# Patient Record
Sex: FEMALE | Race: OTHER | Employment: FULL TIME | URBAN - METROPOLITAN AREA
[De-identification: names, ages, dates, MRNs, and addresses within clinical notes are randomized per-mention and may not be internally consistent; named-entity substitution may affect disease eponyms.]

---

## 2023-02-15 ENCOUNTER — OFFICE VISIT (OUTPATIENT)
Age: 43
End: 2023-02-15
Payer: COMMERCIAL

## 2023-02-15 VITALS
RESPIRATION RATE: 16 BRPM | TEMPERATURE: 98.1 F | HEIGHT: 63 IN | OXYGEN SATURATION: 99 % | WEIGHT: 200 LBS | BODY MASS INDEX: 35.44 KG/M2 | DIASTOLIC BLOOD PRESSURE: 88 MMHG | SYSTOLIC BLOOD PRESSURE: 132 MMHG | HEART RATE: 86 BPM

## 2023-02-15 DIAGNOSIS — E66.01 MORBID OBESITY (HCC): Primary | ICD-10-CM

## 2023-02-15 DIAGNOSIS — I50.9 CONGESTIVE HEART FAILURE, UNSPECIFIED HF CHRONICITY, UNSPECIFIED HEART FAILURE TYPE (HCC): ICD-10-CM

## 2023-02-15 PROCEDURE — 99205 OFFICE O/P NEW HI 60 MIN: CPT | Performed by: STUDENT IN AN ORGANIZED HEALTH CARE EDUCATION/TRAINING PROGRAM

## 2023-02-15 RX ORDER — BENZONATATE 100 MG/1
100 CAPSULE ORAL 3 TIMES DAILY PRN
COMMUNITY

## 2023-02-15 RX ORDER — ALBUTEROL SULFATE 0.63 MG/3ML
1 SOLUTION RESPIRATORY (INHALATION) EVERY 6 HOURS PRN
COMMUNITY

## 2023-02-15 NOTE — PROGRESS NOTES
Austin Esqueda is a 44 y.o. female  Chief Complaint   Patient presents with    New Patient     Consult for Gastric Sleeve     Vitals:    02/15/23 1127   BP: 132/88   Pulse:    Resp:    Temp:    SpO2:          Ambulatory Bariatric Summary 2/15/2023 6/34/2889   Systolic 752 397   Diastolic 88 90   Pulse - 86   Temp - 98.1   Resp - 16   Weight - 200   Height - 63   BMI - 35.5 kg/m2       Body mass index is 35.43 kg/m².

## 2023-02-15 NOTE — PROGRESS NOTES
Bariatric Surgery Initial Consult    Patient: Rheta Bernheim MRN: [id number]  SSN: [id number]    YOB: 1980  Age: 44 y.o. Sex: female      Subjective:      CC: Morbid Obesity    Current Weight: 200  Body mass index is 35.43 kg/m². Ideal body weight: 52.4 kg (115 lb 8.3 oz)  Adjusted ideal body weight: 67.7 kg (149 lb 5 oz)  Excess Body Weight: 59    Rheta Bernheim is a 44 y.o. female who is being seen for new bariatric consultation. She has been considering surgery for approximately the last year. She has failed multiple prior attempts at meaningful and sustained weight loss with previous attempts including a low calorie high exercise diet that yielded as much as 20 pounds of weight loss in 2018 with subsequent recidivism. She did trial phentermine on and off for several years without any significant weight loss. She did have a new diagnosis of CHF/unspecified cardiomyopathy in November 2022 when she was admitted to [facility] for shortness of breath, cough, and peripheral edema. Her cardiologist is Dr. Bojorquez, those notes are not available currently. She reports being better controlled on her current regimen and that she was told previously that her cardiomyopathy may be secondary to prior phentermine use. Other obesity related comorbidities include hypertension which has been controlled on metoprolol. She is interested in a sleeve gastrectomy. She denies a history of reflux/heartburn, regurgitation.     PMH: HTN, unspecified cardiomyopathy    PSH: Denies    Allergies   Allergen Reactions    Naproxen Hives    Spironolactone Hives    Tramadol Itching     Current Outpatient Medications   Medication Sig Dispense Refill    benzonatate (TESSALON) 100 MG capsule Take 100 mg by mouth 3 times daily as needed for Cough      albuterol (ACCUNEB) 0.63 MG/3ML nebulizer solution Take 1 ampule by nebulization every 6 hours as needed for Wheezing      Sacubitril-Valsartan (ENTRESTO PO) Take by mouth       No current facility-administered medications for this visit.     Social History     Tobacco Use    Smoking status: Every Day     Types: Cigarettes    Smokeless tobacco: Never   Substance Use Topics    Alcohol use: Not on file   -Currently smokes 3 to 4 cigarettes daily, has been cutting down    Family history reviewed and is unremarkable     Last Pap Smear: Needs updated  Mammogram: None  Colonoscopy: N/A  EGD/UGI: None  STOP-BANG score: 1  GERD-HRQL score: 0    Review of Systems:    General: Denies fevers, chills, night sweats, fatigue, weight loss, or weight gain.    HEENT: Denies changes in auditory or visual acuity, recurrent pharyngitis, epistaxis, chronic rhinorrhea, vertigo    Respiratory: Denies increasing shortness of breath, productive cough, hemoptysis    Cardiac: + History of cardiomyopathy/CHF per HPI    GI: Denies dysphagia, recurrent emesis, hematemesis, changes in bowel habits, hematochezia, melena    : Denies hematuria frequency urgency dysuria    Musculoskeletal: Denies fractures, dislocations    Neurologic: Denies history of CVA, paralysis paresthesias, recurrent cephalgia, seizures    Endocrine: Denies polyuria, polydipsia, polyphagia, heat and cold intolerance    Lymph/heme: Denies a history of malignancy, anemia, bruising, blood transfusions    Integumentary: Negative for dermatitis     Psych: Denies a history of depression or anxiety    Objective:     Vitals:    02/15/23 1125 02/15/23 1127   BP: (!) 156/90 132/88   Site: Left Lower Arm    Position: Sitting    Pulse: 86    Resp: 16    Temp: 98.1 °F (36.7 °C)    TempSrc: Temporal    SpO2: 99%    Weight: 200 lb (90.7 kg)    Height: 5' 3\" (1.6 m)         General: no acute distress, nontoxic in appearance.  Head: Normocephalic, atraumatic  Mouth: Clear, no overt lesions, oral mucosa is pink and moist.  Neck: Supple, no masses, trachea midline  Resp: Clear to auscultation bilaterally, no wheezing. Excursions normal and  symmetrical.  Cardio: Regular rate and rhythm, no murmurs  Abdomen: soft, nontender, nondistended, no hernias. Extremities: Warm, well perfused, no tenderness or swelling, normal gait/station, without edema or varicosities  Neuro: Sensation and strength grossly intact and symmetrical.  Psych: Alert and oriented to person, place, and time. Labs:       Assessment: This patient is a 44 y.o. obese female with a current Body mass index is 35.43 kg/m². who is considering bariatric surgery, and would be an appropriate candidate for enrollment in the preoperative pathway. The patient is considering Laparoscopic Sleeve Gastrectomy for surgical weight loss due to their ineffective progress with medical forms of weight loss and the urging of their physicians who care for their primary medical issues. The patient  now presents  for consideration for weight loss surgery understanding the benefits of this over a medical approach of weight loss as was discussed in our seminar on weight loss surgery. They have discussed their plans both with their family and primary care physician who is in support of their pursuit of such. The patient's goal weight is 160 lb. These goals are consistent with expected outcomes of their desired operation. Her Medical goals are resolution of these health issues. The possible short and long term  complications of the sleeve gastrectomy were also discussed, to include but not limited to;death, DVT/PE, staple line leak, bleeding, stricture formation, surgical site infection, and reflux. I spent a significant amount of time emphasizing the reflux risk from this procedure which ranges from 25-40% postoperatively.   Specific weight related outcomes for success were also discussed with an emphasis on careful and close follow-up with the first year and dietary behavior modification over the first years as baseline cyclical hunger returns  The patient expressed an understanding of the above factors, and her questions were answered in their entirety. Plan: Will plan to enroll in the preoperative bariatric pathway. I discussed our program smoking policy. She states that she will quit smoking cold turkey. I would like her to get her cotinine lab draw without the aid of nicotine patches, though if she requires those ultimately to stay smoke-free I can provide a prescription for that. Will schedule consultation with our dietitian  Will schedule for preoperative psych clearance  Standard preoperative bariatric lab panel ordered. H. pylori breath test unable to be performed today and will be performed at her midpoint evaluation  Upper GI ordered  Discussed the need for a PCP. The patient recently moved to this area and has not established care yet. She additionally requires routine cancer screening such as Pap smear and mammogram.  Return to clinic for midpoint evaluation      I spent >60 minutes on this patient's care today, including reviewing all pertinent medical records, imaging, performing a history and physical exam, documenting, and coordinating future care.     Signed By: Gerda Fleming MD     February 15, 2023

## 2023-03-01 ENCOUNTER — CLINICAL DOCUMENTATION (OUTPATIENT)
Facility: HOSPITAL | Age: 43
End: 2023-03-01

## 2023-03-01 NOTE — PROGRESS NOTES
[address] [place] [facility] [place] [address] [facility], [address]    Patient's Name: Emili White   Age: 44 y.o. YOB: 1980   Sex: female     Insurance:  1           Session: 1 of 12  Revision:    Surgeon: Dr. Sharla Martin  Date: 3/1/2023    Height: 5 f 3   Weight:    200      Lbs. BMI:    Pounds Lost since last month: 0                 Pounds Gained since last month: 0      Starting Weight: 200     Previous Months Weight: 200  Overall Pounds Lost: 0   Overall Pounds Gained: 0      Do you smoke? Yes, 5-8 cigarettes per day    Alcohol intake:  Number of drinks at a time:  None  Number of times a week: None    Patient has not attended a support group. Information was provided. Class Guidelines    Guidelines are reviewed with patient at the start of every class. 1. Patient understands that weight loss trial classes must be consecutive. Patient understands if they miss a class, it is their responsibility to contact me to reschedule class. I will reach out to patient after their first no show. 2.  Patient understands the expectations that weight maintenance/weight loss is expected during the classes. Failure to demonstrate changes may result in one extra month of weight loss trial, followed by going back to see the surgeon. Patient understands that they CAN NOT gain any weight during the weight loss trial.  Gaining weight will result in extra classes. 3. Patient is also instructed to be doing their labs, blood work, psych visit, support group and any other test that the surgeon has used while they are working on their weight loss trial.  4.  Patient was instructed to bring their blue binder to every class and appointment. Other Pertinent Information:     Changes Made Since Last Class: less rice    Eating Habits and Behaviors    Today in class, we reviewed the key diet principles.   I have talked to patient about pushing the fluid and working towards 64 ounces per day. Patient was given ideas of liquids that would be okay. Patient was encouraged to cut out liquid calories, such as soda and sweet tea. We talked about the reasons that sugar sweetened beverages can promote weight gain. Sugar is highly palatable. Excessive consumption of sugar can trigger an exaggerated release of dopamine, which can promote a compulsive drive to consume more sugar sweetened beverages. Also, satiety is not reached with liquid calories the same way it does with solid calories. In class, we also talked about focusing on protein and low carbohydrates. Patient was encouraged during the weight loss trial to keep their carbohydrate less than 75 grams per day and their protein level at 60-80 grams per day. We talked about meal choices and snack ideas. Patient was given a packet on carbohydrate substitutions and recipe exchanges. This will allow them to still have some of the foods they enjoy, but a lower carbohydrate alternative. Patient's current diet habits include: Patient is eating 3 meals per day. Patient states their portions are regular plate. I have recommended patient to use a smaller plate and to drink water prior to their meal to help fill them up. We talked about eating and drinking post op and stopping 30 minutes before and after. Patient indicates they are eating out 3 times a month. This includes fast food, carry out, and sit down restaurants. I have talked to patient about starting to plan ahead to cut down on eating out. Patient indicates their indicate of bread, rice, pasta, crackers to be few times a week. I have talked to patient about the importance of focusing on protein at meals. We talked about trying to limit carbohydrates. Patient is drinking 7-8 bottles a day . Patient was encouraged to aim for 64 ounces daily. I talked about focusing on zero calorie liquids and not drinking calories and weaning from caffeine. Physical Activity/Exercise    Comments: We talked about exercise. Patient was given reasons of why exercise is so important and how that can help with their long-term success. I have encouraged patient to get a support system to help with the activity. Patient is currently doing 30 minutes of cardio in the AM for activity. Behavior Modification       Comments:  During today's lesson, I also spent some time talking about behavior changes. I talked to patient about the importance of taking vitamins post op and we reviewed the vitamins that patients will be taking post op. Patient will hear this again at pre op class before surgery. Patient had the opportunity to ask questions about these vitamins that will be lifelong. Goals that patient wants to work on includes:  1. Cut down on the magen  2.        Mario Comment, Juanjose Sutton 87 RD  3/1/2023

## 2023-03-17 ENCOUNTER — CLINICAL DOCUMENTATION (OUTPATIENT)
Facility: HOSPITAL | Age: 43
End: 2023-03-17

## 2023-03-17 NOTE — PROGRESS NOTES
[facility]  [address], [address]    Behavior Modification Intervention    Patient's Name: Stephen Castillo   Age: 43 y.o.  YOB: 1980   Sex: female    Date:   3/17/2023              Session: 2 of 12  Surgeon:  Dr. Martinez    Height: 5 f 3   Weight:    200      Lbs.     BMI:    Pounds Lost since last month: 0                 Pounds Gained since last month: 0    Starting Weight: 200     Previous Month's Weight: 200  Overall Pounds Lost: 0   Overall Pounds Gained: 0    Do you smoke?  Yes    Alcohol intake:  Number of drinks at a time:  None  Number of times a week: None    Class Guidelines    Guidelines are reviewed with patient at the start of every class.    1. Patient understands that weight loss trial classes must be consecutive.  Patient understands if they miss a class, it is their responsibility to contact me to reschedule class.  I will reach out to patient after their first no show.  2.  Patient understands the expectations that weight maintenance/weight loss is expected during the classes.  Failure to demonstrate changes may result in one extra month of weight loss trial, followed by going back to see the surgeon.    3. Patient is also instructed to be doing their labs, blood work, psych visit, support group and any other test that the surgeon has used while they are working on their weight loss trial.   Patient understands that they CAN NOT gain any weight during the weight loss trial.  Gaining weight will result in extra classes    Other Pertinent Information:         Behavior Modification Intervention    At today's class, we talked about the following behavior modification:   Staying Motivated and Managing Stress.     We first talked about identifying triggers.  Keeping a stress journal can help them identify how they react when they are faced with a stressful event.  Patient is encouraged to avoid trigger foods  when they are experiencing stress. Other tips for managing stress include:  - Find enjoyment in weight loss efforts. Have an \"I can do it\" attitude. - Lose weight for YOU increases the likelihood of long-term success. External factors may work for a short time, but when the fear decreases or the event is over, the motivation to keep losing can fade. Internal factors include having more energy, less medications, higher self-esteem. Stress techniques to control overeating include:  Exercising  Using deep breathing  Organize your day with To Do Lists  Set priorities  Delegate responsibilities  Make time for you. We also talked about ways to keep motivation high. These include:  - Keeping plateaus in perspective. - Get in touch with feelings. - Separate their weight from their self-worth. Patient was then asked to identify their most stressful event or situation in their life. Patient was then asked to list the ways they respond to stress: states she tries to sleep or meditate. Patient and I talked about strategies to help reduce or manage the stress in their life. Patient felt they could: mediate or drink more water or walk    Today Registered Dietitian met with the patient and assessed how patient is doing with the goals that were set. Patient states they are doing well. States following key diet principles and trying not to eat after 6 pm.    RD also re-emphasized the diet key principles. Patient was encouraged to start drinking 64 ounces of fluid per day. Patient was encouraged to start cutting out soda, caffeine, carbonation, sweet tea, fruit juice, and fruit smoothies. Patient was instructed to lower carbohydrates to 75 grams or less per day and focus on protein-based foods. Patient was also instructed to work towards 30 minutes of activity per day. Suggestions for activity were reviewed. Will follow up in 2 weeks.   Emilee Carranza RD

## 2023-03-21 ENCOUNTER — HOSPITAL ENCOUNTER (OUTPATIENT)
Facility: HOSPITAL | Age: 43
Discharge: HOME | End: 2023-03-24
Payer: COMMERCIAL

## 2023-03-21 DIAGNOSIS — E66.01 MORBID OBESITY (HCC): ICD-10-CM

## 2023-03-21 PROCEDURE — 74246 X-RAY XM UPR GI TRC 2CNTRST: CPT

## 2023-03-21 PROCEDURE — 6370000000 HC RX 637 (ALT 250 FOR IP): Performed by: STUDENT IN AN ORGANIZED HEALTH CARE EDUCATION/TRAINING PROGRAM

## 2023-03-21 PROCEDURE — 2500000003 HC RX 250 WO HCPCS: Performed by: STUDENT IN AN ORGANIZED HEALTH CARE EDUCATION/TRAINING PROGRAM

## 2023-03-21 RX ADMIN — BARIUM SULFATE 140 ML: 980 POWDER, FOR SUSPENSION ORAL at 11:11

## 2023-03-21 RX ADMIN — ANTACID/ANTIFLATULENT 1 EACH: 380; 550; 10; 10 GRANULE, EFFERVESCENT ORAL at 11:11

## 2023-03-21 RX ADMIN — BARIUM SULFATE 176 G: 960 POWDER, FOR SUSPENSION ORAL at 11:11

## 2023-04-05 ENCOUNTER — HOSPITAL ENCOUNTER (OUTPATIENT)
Facility: HOSPITAL | Age: 43
Discharge: HOME | End: 2023-04-08

## 2023-04-05 ENCOUNTER — CLINICAL DOCUMENTATION (OUTPATIENT)
Facility: HOSPITAL | Age: 43
End: 2023-04-05

## 2023-04-05 NOTE — PROGRESS NOTES
Make grocery runs while traveling  - Bring a cooler to pack snacks, such as yogurt, protein drinks, or cottage cheese    We also talked about ways to enter special occasions with wisdom. This included:  - Ways to approach a potluck of buffet  - Sticking to the perimeter of the store. - Slow down and take 20-30 minutes to eat a meal.    Patient was then asked to identify strategies that may work when traveling or special occasions:  She states she does okay as she tries to plan ahead of time. .      Today Registered Dietitian met with the patient and assessed how patient is doing with the goals that were set. Patient states they are doing well. She is eating fruits instead of other snacks. She is getting a regular plate instead of a smaller plate. RD also re-emphasized the diet key principles. Patient was encouraged to start drinking 64 ounces of fluid per day. Patient was encouraged to start cutting out soda, caffeine, carbonation, sweet tea, fruit juice, and fruit smoothies. Patient was instructed to lower carbohydrates to 75 grams or less per day and focus on protein-based foods. Patient was also instructed to work towards 30 minutes of activity per day. Suggestions for activity were reviewed. Will follow up in 2 weeks.   Rojelio Garrison RD

## 2023-04-20 ENCOUNTER — CLINICAL DOCUMENTATION (OUTPATIENT)
Facility: HOSPITAL | Age: 43
End: 2023-04-20

## 2023-04-20 ENCOUNTER — HOSPITAL ENCOUNTER (OUTPATIENT)
Facility: HOSPITAL | Age: 43
Discharge: HOME | End: 2023-04-23

## 2023-04-20 NOTE — PROGRESS NOTES
[address] [facility] [place] [place] [address] [facility], [address]    Patient's Name: Gonzalo Aviles   Age: 44 y.o. YOB: 1980   Sex: female    Date:   4/20/2023    Insurance:              Session: 4 of 12  Surgeon:  Dr. Liana Sanchez    Height: 5 f 3   Weight:    198      Lbs. BMI: 35.1   Pounds Lost since last month: 2                 Pounds Gained since last month: 0    Starting Weight: 200     Previous Months Weight: 200  Overall Pounds Lost: 2   Overall Pounds Gained: 0    Patient has been to a support group meeting. Do you smoke? Yes. She understands she needs to be nicotine free for 3 months    Alcohol intake:  Number of drinks at a time:  None  Number of times a week: None    Class Guidelines    Guidelines are reviewed with patient at the start of every class. 1. Patient understands that weight loss trial classes must be consecutive. Patient understands if they miss a class, it is their responsibility to contact me to reschedule class. I will reach out to patient after their first no show. 2.  Patient understands the expectations that weight maintenance/weight loss is expected during the classes. Failure to demonstrate changes may result in one extra month of weight loss trial, followed by going back to see the surgeon. Patient understands that they CAN NOT gain any weight during the weight loss trial.  Gaining weight will result in extra classes. 3. Patient is also instructed to be doing their labs, blood work, psych visit, support group and any other test that the surgeon has used while they are working on their weight loss trial.  4.  Patient was instructed to bring their blue binder to every class and appointment. Other Pertinent Information:     Changes Made Since Last Class: None    Eating Habits and Behaviors      We started off class today by reviewing key diet principles.   Patient was given a very specific list of foods

## 2023-05-04 ENCOUNTER — CLINICAL DOCUMENTATION (OUTPATIENT)
Facility: HOSPITAL | Age: 43
End: 2023-05-04

## 2023-05-04 NOTE — PROGRESS NOTES
sipping throughout the day, cutting out caffeine and carbonation, and working towards 64 ounces per day. Physical Activity/Exercise    Comments:     Currently for exercise, patient is doing a lot more walking. Patient was given a list of ideas for activity and was encouraged to incorporate 30 minutes a day into their daily routine. Behavior Modification       Comments: In class, we also focused on the behavior aspects of weight management. This includes being a mindful eater and not eating in front of the TV. Patient is also encouraged to take 20 minutes to eat a meal and eat at a table. Patient states they are currently taking 20 minutes to eat a meal.    One goal for next month includes:  1. Stop smoking  2.       Luci De Los Santos, RD    5/4/2023

## 2023-05-05 ENCOUNTER — HOSPITAL ENCOUNTER (OUTPATIENT)
Facility: HOSPITAL | Age: 43
Discharge: HOME | End: 2023-05-08

## 2023-05-15 ENCOUNTER — OFFICE VISIT (OUTPATIENT)
Age: 43
End: 2023-05-15

## 2023-05-15 VITALS
TEMPERATURE: 98.2 F | HEIGHT: 63 IN | BODY MASS INDEX: 35.79 KG/M2 | OXYGEN SATURATION: 99 % | WEIGHT: 202 LBS | HEART RATE: 102 BPM | DIASTOLIC BLOOD PRESSURE: 92 MMHG | SYSTOLIC BLOOD PRESSURE: 158 MMHG | RESPIRATION RATE: 16 BRPM

## 2023-05-15 DIAGNOSIS — Z72.0 TOBACCO USE: ICD-10-CM

## 2023-05-15 DIAGNOSIS — Z71.89 ENCOUNTER FOR PRE-BARIATRIC SURGERY COUNSELING AND EDUCATION: ICD-10-CM

## 2023-05-15 DIAGNOSIS — E66.01 CLASS 2 SEVERE OBESITY DUE TO EXCESS CALORIES WITH SERIOUS COMORBIDITY AND BODY MASS INDEX (BMI) OF 35.0 TO 35.9 IN ADULT (HCC): Primary | ICD-10-CM

## 2023-05-15 DIAGNOSIS — Z01.818 PRE-OP TESTING: ICD-10-CM

## 2023-05-15 DIAGNOSIS — Z71.6 ENCOUNTER FOR TOBACCO USE CESSATION COUNSELING: ICD-10-CM

## 2023-05-15 DIAGNOSIS — I50.9 CONGESTIVE HEART FAILURE, UNSPECIFIED HF CHRONICITY, UNSPECIFIED HEART FAILURE TYPE (HCC): ICD-10-CM

## 2023-05-15 RX ORDER — METOPROLOL SUCCINATE 25 MG/1
25 TABLET, EXTENDED RELEASE ORAL DAILY
COMMUNITY
Start: 2023-04-10

## 2023-05-15 RX ORDER — NICOTINE 21 MG/24HR
PATCH, TRANSDERMAL 24 HOURS TRANSDERMAL
Qty: 28 PATCH | Refills: 0 | Status: SHIPPED | OUTPATIENT
Start: 2023-05-15

## 2023-05-15 RX ORDER — NICOTINE 21 MG/24HR
PATCH, TRANSDERMAL 24 HOURS TRANSDERMAL
Qty: 42 PATCH | Refills: 0 | Status: SHIPPED | OUTPATIENT
Start: 2023-05-15

## 2023-05-15 RX ORDER — ATORVASTATIN CALCIUM 20 MG/1
TABLET, FILM COATED ORAL
COMMUNITY
Start: 2023-04-10

## 2023-05-15 NOTE — PROGRESS NOTES
Bariatric Preoperative Progress Note    Subjective:     Juju Gomes is a 44 y.o. female who presents today for followup of their candidacy for bariatric surgery. Since last seen, Juju Gomes has been working through our bariatric program towards gastric sleeve with Dr. Bruno Guardado Weight: 200  Today's Weight: 202  She is still smoking on average 4-5 cigarettes daily. She desires to quit but has been unable to on her own. Past Medical History:   Diagnosis Date    CHF (congestive heart failure) (HCC)     Hyperlipidemia     Hypertension        Past Surgical History:   Procedure Laterality Date    ABSCESS DRAINAGE      mastitis chest       Current Outpatient Medications   Medication Sig Dispense Refill    atorvastatin (LIPITOR) 20 MG tablet TAKE 1 TABLET BY MOUTH EVERYDAY AT BEDTIME      metoprolol succinate (TOPROL XL) 25 MG extended release tablet Take 1 tablet by mouth daily      nicotine (NICODERM CQ) 14 MG/24HR For greater than 10 cigarettes per day: apply one 21mg patch once daily for 6 weeks (42 days). Then apply 14mg patch once daily for 2 weeks (14 days). 28 patch 0    nicotine (NICODERM CQ) 21 MG/24HR For greater than 10 cigarettes per day: apply one 21mg patch once daily for 6 weeks (42 days). Then apply 14mg patch once daily for 2 weeks (14 days). 42 patch 0    albuterol (ACCUNEB) 0.63 MG/3ML nebulizer solution Take 3 mLs by nebulization every 6 hours as needed for Wheezing      Sacubitril-Valsartan (ENTRESTO PO) Take by mouth       No current facility-administered medications for this visit. Allergies   Allergen Reactions    Naproxen Hives    Spironolactone Hives    Tramadol Itching       ROS:  Review of Systems   Constitutional:  Negative for chills and fever. Respiratory:  Negative for cough and shortness of breath. Cardiovascular:  Negative for chest pain and palpitations. Gastrointestinal:  Negative for abdominal pain, constipation, diarrhea, nausea and vomiting.

## 2023-05-16 ENCOUNTER — HOSPITAL ENCOUNTER (OUTPATIENT)
Facility: HOSPITAL | Age: 43
Setting detail: SPECIMEN
Discharge: HOME | End: 2023-05-19
Payer: COMMERCIAL

## 2023-05-16 DIAGNOSIS — Z01.818 PRE-OP TESTING: ICD-10-CM

## 2023-05-16 PROCEDURE — 83013 H PYLORI (C-13) BREATH: CPT

## 2023-05-16 ASSESSMENT — ENCOUNTER SYMPTOMS
SHORTNESS OF BREATH: 0
DIARRHEA: 0
VOMITING: 0
ABDOMINAL PAIN: 0
NAUSEA: 0
COUGH: 0
CONSTIPATION: 0

## 2023-05-17 LAB — UREA BREATH TEST QL: NEGATIVE

## 2023-05-19 ENCOUNTER — CLINICAL DOCUMENTATION (OUTPATIENT)
Facility: HOSPITAL | Age: 43
End: 2023-05-19

## 2023-05-19 ENCOUNTER — HOSPITAL ENCOUNTER (OUTPATIENT)
Facility: HOSPITAL | Age: 43
Discharge: HOME | End: 2023-05-22

## 2023-05-19 NOTE — PROGRESS NOTES
[facility] [address] [facility]  [address] [facility], [address]    Behavior Modification Intervention    Patient's Name: Anita Perez   Age: 44 y.o. YOB: 1980   Sex: female    Date:   5/19/2023              Session: 5 of 12  Surgeon:  Dr. Dom Jerome    Height: 5 f 3   Weight:    200      Lbs. BMI:    Pounds Lost since last month: 0                 Pounds Gained since last month: 0    Starting Weight: 200     Previous Month's Weight: 200  Overall Pounds Lost: 0   Overall Pounds Gained: 0    Do you smoke? Yes    Alcohol intake:  Number of drinks at a time:  Special occasions  Number of times a week:     Class Guidelines    Guidelines are reviewed with patient at the start of every class. 1. Patient understands that weight loss trial classes must be consecutive. Patient understands if they miss a class, it is their responsibility to contact me to reschedule class. I will reach out to patient after their first no show. 2.  Patient understands the expectations that weight maintenance/weight loss is expected during the classes. Failure to demonstrate changes may result in one extra month of weight loss trial, followed by going back to see the surgeon. 3. Patient is also instructed to be doing their labs, blood work, psych visit, support group and any other test that the surgeon has used while they are working on their weight loss trial.   Patient understands that they CAN NOT gain any weight during the weight loss trial.  Gaining weight will result in extra classes    Other Pertinent Information:         Behavior Modification Intervention    At today's class, we talked about the following behavior modification:   Positive Thinking    In today's lesson, I reviewed a power point with patient on positive thinking.     These tips include:  Not overgeneralizing or setting rules that can end up making one feel like they failed  Being open to

## 2023-06-07 ENCOUNTER — CLINICAL DOCUMENTATION (OUTPATIENT)
Facility: HOSPITAL | Age: 43
End: 2023-06-07

## 2023-06-07 ENCOUNTER — HOSPITAL ENCOUNTER (OUTPATIENT)
Facility: HOSPITAL | Age: 43
Discharge: HOME | End: 2023-06-10

## 2023-06-07 NOTE — PROGRESS NOTES
that are lower in carbohydrates and focus more on protein. Patient was encouraged to start trying protein shakes and was given a list of suggestions. The main topic of class today was: Portion Control. We reviewed in class a power point filled with tips on ways to control portions, including using smaller plates, boxing up portions at a restaurant before starting to eat, and not eating from the container, but rather portioning snacks into smaller bags. Patient's were encouraged to food journal, which helps increase awareness of what and how much they are eating. It was emphasized to patient the importance of reading labels and portion sizes, but also applying these portion sizes. Patient was given a list of items that can help to make portion control easier. For example, a deck of cards or a palm of a hand is a proper portion of meat, a fist is a cup or a proper serving of vegetables. Patient was given 10 tips to help with the portion control. Patient's current diet habits include: Patient's current diet habits include: Patient is eating 2 meals a day. I have talked to patient about the importance of eating within 1 hour of waking up and aiming for 3 meals a day. May use a protein drink now if they are struggling with with eating 3 x a day. Patient is eating fast food:  0 times a week. Carry out intake is: 0 times a week. .  Sit down restaurant intake is: 0 times a week. I have talked to patient about meal planning and trying to decrease intake of eating out. Patient indicated they are eating bread, rice, pasta, crackers, chips, etc. 2 times a day. Patient is snacking on cookies, chips. This is being done 2 times a deay. I  have talked to patient about the need to cut out carbohydrates that won't be part of the diet after surgery. Patient's fluid intake is: water, juice, and tea .    We talked about the need to get into a routine now of sipping throughout the day, cutting out caffeine

## 2023-06-19 ENCOUNTER — HOSPITAL ENCOUNTER (OUTPATIENT)
Facility: HOSPITAL | Age: 43
Discharge: HOME | End: 2023-06-22

## 2023-06-19 ENCOUNTER — CLINICAL DOCUMENTATION (OUTPATIENT)
Facility: HOSPITAL | Age: 43
End: 2023-06-19

## 2023-06-19 NOTE — PROGRESS NOTES
[facility]  [address] [address] [employer] [facility], [address]    Behavior Modification Intervention    Patient's Name: Kalyn Johnston   Age: 44 y.o. YOB: 1980   Sex: female    Date:   6/19/2023              Session: 7 of 12  Surgeon:  Dr. Josue Charles    Height: 5 f 3   Weight:    200      Lbs. BMI:    Pounds Lost since last month: 0                 Pounds Gained since last month: 0    Starting Weight: 200     Previous Month's Weight: 200  Overall Pounds Lost: 0   Overall Pounds Gained: 0    Do you smoke? States she is on the patch    Alcohol intake:  Number of drinks at a time:  None  Number of times a week: None    Class Guidelines    Guidelines are reviewed with patient at the start of every class. 1. Patient understands that weight loss trial classes must be consecutive. Patient understands if they miss a class, it is their responsibility to contact me to reschedule class. I will reach out to patient after their first no show. 2.  Patient understands the expectations that weight maintenance/weight loss is expected during the classes. Failure to demonstrate changes may result in one extra month of weight loss trial, followed by going back to see the surgeon. 3. Patient is also instructed to be doing their labs, blood work, psych visit, support group and any other test that the surgeon has used while they are working on their weight loss trial.   Patient understands that they CAN NOT gain any weight during the weight loss trial.  Gaining weight will result in extra classes    Other Pertinent Information:           Behavior Modification Intervention    At the last nutrition visit, Registered Dietitian talked to patient about the following behavior modification:  Setting S.M.A.R.T. Goals.   Patient was educated on setting goals that were:  S- Specific  M- Measurable  A- Attainable  R- Relevant  T- Time-Orientated     Registered Dietitian

## 2023-07-14 ENCOUNTER — CLINICAL DOCUMENTATION (OUTPATIENT)
Facility: HOSPITAL | Age: 43
End: 2023-07-14

## 2023-07-14 ENCOUNTER — HOSPITAL ENCOUNTER (OUTPATIENT)
Facility: HOSPITAL | Age: 43
Discharge: HOME | End: 2023-07-17

## 2023-07-14 NOTE — PROGRESS NOTES
Patient was instructed to start cutting out bread, rice, and pasta from the diet and start focusing more on meat and vegetables. I then gave a power point, which focused on Label Reading. In class, I gave patients a labels and we worked through a series of questions to help patients have a better understanding of label reading. Patient was instructed to review the serving size. Patient was encouraged to focus on protein and carbohydrates. We also did a few label reading activities to help the patient become more familiar with label reading. We also talked about healthier snack options in class that focus on protein and vegetables. Patient's current diet habits include: Patient's current diet habits include: Patient is eating 3 meals a day. We talked in class about the importance of eating 3 meals a day and eating within 1 hour of waking up. Breakfast ideas were addressed with the patient. Patient states their portion sizes are 2. I have encouraged patient to use a smaller plate and fill up on water before meals to help cut down on portions. Patient is eating fast food: 1 time a month. Carry out intake is: 0 times a week. Sit down restaurant intake is: 0 times a week. Patient's is eating bread, rice, pasta, cereal, and other carbohydrates 0 times a week. Patient is snacking on peanut butter crackers and fruit. This is being done 1-2 times a day. Patient's sweet intake is 1 times a week. I have talked about the importance of getting into the habit now of cutting the sweets out. Fluid intake:  water and sweet tea and fruit juices. I have talked to patient about the importance of getting into a routine of sipping fluid throughout the day. Cutting out all liquid calories. Aim for 64 ounces of fluid per day. Physical Activity/Exercise    Comments: We talked about exercise.   Patient was given reasons of why exercise is so important and how that can help with their long-term

## 2023-07-17 ENCOUNTER — TELEPHONE (OUTPATIENT)
Age: 43
End: 2023-07-17

## 2023-07-17 NOTE — TELEPHONE ENCOUNTER
Patient arrived late for new patient appt, offered to reschedule, pt became irritated and declined appt

## 2023-07-26 ENCOUNTER — CLINICAL DOCUMENTATION (OUTPATIENT)
Facility: HOSPITAL | Age: 43
End: 2023-07-26

## 2023-07-26 NOTE — PROGRESS NOTES
7/26/23:  Patient has been actively smoking during the weight loss trial. She has been working on stopping. She saw Gita Salvador in May and was to continue her nutrition classes, following up in July with a negative test.  Per Preeti's note, patient was aware that she would not be allowed to continue in the classes after July if a negative nicotine test was not provided. Patient did not get her labs done for the appointment, which was then canceled. I have reached out to patient and asked if she could go have her labs done for her nicotine test.  I have discussed with her that her August 8 appointment with me would need to be canceled and enrollment stopped if she was not able to provide this test per her May conversation with Gita Salvador. Patient stated she will try to get this done, but it's difficult with work.   I have made patient aware that the lab is open on Saturdays from 7-11:30 am.    Ignacio Yoder MS RD

## 2023-07-27 ENCOUNTER — HOSPITAL ENCOUNTER (OUTPATIENT)
Facility: HOSPITAL | Age: 43
Discharge: HOME | End: 2023-07-30

## 2023-07-27 ENCOUNTER — CLINICAL DOCUMENTATION (OUTPATIENT)
Facility: HOSPITAL | Age: 43
End: 2023-07-27

## 2023-07-27 NOTE — PROGRESS NOTES
[facility]  [address] [facility], [address]    Behavior Modification Intervention    Patient's Name: Gladis Ozuna   Age: 44 y.o. YOB: 1980   Sex: female    Date:   7/27/2023              Session: 9 of 12  Surgeon:  Dr. Eduardo Yeboah    Height: 5  f3   Weight:    200      Lbs. BMI:    Pounds Lost since last month: 0                 Pounds Gained since last month: 0    Starting Weight: 200     Previous Month's Weight: 200  Overall Pounds Lost: 0   Overall Pounds Gained: 0    Do you smoke? On the patch    Alcohol intake:  Number of drinks at a time:  None  Number of times a week: None    Class Guidelines    Guidelines are reviewed with patient at the start of every class. 1. Patient understands that weight loss trial classes must be consecutive. Patient understands if they miss a class, it is their responsibility to contact me to reschedule class. I will reach out to patient after their first no show. 2.  Patient understands the expectations that weight maintenance/weight loss is expected during the classes. Failure to demonstrate changes may result in one extra month of weight loss trial, followed by going back to see the surgeon. 3. Patient is also instructed to be doing their labs, blood work, psych visit, support group and any other test that the surgeon has used while they are working on their weight loss trial.   Patient understands that they CAN NOT gain any weight during the weight loss trial.  Gaining weight will result in extra classes    Other Pertinent Information:           Behavior Modification Intervention    At the last nutrition visit, Registered Dietitian talked to patient about the following behavior modification:  Behavior Challenge of Moving. Patient has been challenged to work on moving more. A few examples that patient is encouraged to commit to include:  Walking more steps every day.   Ride your

## 2023-08-08 ENCOUNTER — HOSPITAL ENCOUNTER (OUTPATIENT)
Facility: HOSPITAL | Age: 43
Discharge: HOME | End: 2023-08-11
Payer: COMMERCIAL

## 2023-08-08 ENCOUNTER — CLINICAL DOCUMENTATION (OUTPATIENT)
Facility: HOSPITAL | Age: 43
End: 2023-08-08

## 2023-08-08 ENCOUNTER — TELEPHONE (OUTPATIENT)
Age: 43
End: 2023-08-08

## 2023-08-08 ENCOUNTER — HOSPITAL ENCOUNTER (OUTPATIENT)
Facility: HOSPITAL | Age: 43
Discharge: HOME | End: 2023-08-11

## 2023-08-08 LAB
25(OH)D3 SERPL-MCNC: 8 NG/ML (ref 30–100)
ALBUMIN SERPL-MCNC: 4.3 G/DL (ref 3.4–5)
ALBUMIN/GLOB SERPL: 1.3 (ref 0.8–1.7)
ALP SERPL-CCNC: 83 U/L (ref 45–117)
ALT SERPL-CCNC: 19 U/L (ref 13–56)
AMPHET UR QL SCN: NEGATIVE
ANION GAP SERPL CALC-SCNC: 6 MMOL/L (ref 3–18)
AST SERPL-CCNC: 10 U/L (ref 10–38)
BARBITURATES UR QL SCN: NEGATIVE
BASOPHILS # BLD: 0 K/UL (ref 0–0.1)
BASOPHILS NFR BLD: 0 % (ref 0–2)
BENZODIAZ UR QL: NEGATIVE
BILIRUB SERPL-MCNC: 0.3 MG/DL (ref 0.2–1)
BUN SERPL-MCNC: 12 MG/DL (ref 7–18)
BUN/CREAT SERPL: 13 (ref 12–20)
CALCIUM SERPL-MCNC: 8.7 MG/DL (ref 8.5–10.1)
CANNABINOIDS UR QL SCN: NEGATIVE
CHLORIDE SERPL-SCNC: 108 MMOL/L (ref 100–111)
CHOLEST SERPL-MCNC: 164 MG/DL
CO2 SERPL-SCNC: 24 MMOL/L (ref 21–32)
COCAINE UR QL SCN: NEGATIVE
CREAT SERPL-MCNC: 0.94 MG/DL (ref 0.6–1.3)
DIFFERENTIAL METHOD BLD: ABNORMAL
EOSINOPHIL # BLD: 0.1 K/UL (ref 0–0.4)
EOSINOPHIL NFR BLD: 1 % (ref 0–5)
ERYTHROCYTE [DISTWIDTH] IN BLOOD BY AUTOMATED COUNT: 14.1 % (ref 11.6–14.5)
EST. AVERAGE GLUCOSE BLD GHB EST-MCNC: 103 MG/DL
GLOBULIN SER CALC-MCNC: 3.4 G/DL (ref 2–4)
GLUCOSE SERPL-MCNC: 110 MG/DL (ref 74–99)
HBA1C MFR BLD: 5.2 % (ref 4.2–5.6)
HCT VFR BLD AUTO: 39.7 % (ref 35–45)
HDLC SERPL-MCNC: 58 MG/DL (ref 40–60)
HDLC SERPL: 2.8 (ref 0–5)
HGB BLD-MCNC: 12.6 G/DL (ref 12–16)
IMM GRANULOCYTES # BLD AUTO: 0 K/UL (ref 0–0.04)
IMM GRANULOCYTES NFR BLD AUTO: 0 % (ref 0–0.5)
IRON SERPL-MCNC: 29 UG/DL (ref 50–175)
LDLC SERPL CALC-MCNC: 84.4 MG/DL (ref 0–100)
LIPID PANEL: NORMAL
LYMPHOCYTES # BLD: 2.1 K/UL (ref 0.9–3.6)
LYMPHOCYTES NFR BLD: 28 % (ref 21–52)
Lab: NORMAL
MCH RBC QN AUTO: 30.4 PG (ref 24–34)
MCHC RBC AUTO-ENTMCNC: 31.7 G/DL (ref 31–37)
MCV RBC AUTO: 95.7 FL (ref 78–100)
METHADONE UR QL: NEGATIVE
MONOCYTES # BLD: 0.5 K/UL (ref 0.05–1.2)
MONOCYTES NFR BLD: 7 % (ref 3–10)
NEUTS SEG # BLD: 4.8 K/UL (ref 1.8–8)
NEUTS SEG NFR BLD: 64 % (ref 40–73)
NRBC # BLD: 0 K/UL (ref 0–0.01)
NRBC BLD-RTO: 0 PER 100 WBC
OPIATES UR QL: NEGATIVE
PCP UR QL: NEGATIVE
PLATELET # BLD AUTO: 370 K/UL (ref 135–420)
PMV BLD AUTO: 10.4 FL (ref 9.2–11.8)
POTASSIUM SERPL-SCNC: 3.6 MMOL/L (ref 3.5–5.5)
PROT SERPL-MCNC: 7.7 G/DL (ref 6.4–8.2)
RBC # BLD AUTO: 4.15 M/UL (ref 4.2–5.3)
SODIUM SERPL-SCNC: 138 MMOL/L (ref 136–145)
TRIGL SERPL-MCNC: 108 MG/DL
TSH SERPL DL<=0.05 MIU/L-ACNC: 2.07 UIU/ML (ref 0.36–3.74)
VIT B12 SERPL-MCNC: 349 PG/ML (ref 211–911)
VLDLC SERPL CALC-MCNC: 21.6 MG/DL
WBC # BLD AUTO: 7.6 K/UL (ref 4.6–13.2)

## 2023-08-08 PROCEDURE — 80053 COMPREHEN METABOLIC PANEL: CPT

## 2023-08-08 PROCEDURE — 80307 DRUG TEST PRSMV CHEM ANLYZR: CPT

## 2023-08-08 PROCEDURE — 82306 VITAMIN D 25 HYDROXY: CPT

## 2023-08-08 PROCEDURE — 83036 HEMOGLOBIN GLYCOSYLATED A1C: CPT

## 2023-08-08 PROCEDURE — 36415 COLL VENOUS BLD VENIPUNCTURE: CPT

## 2023-08-08 PROCEDURE — 82607 VITAMIN B-12: CPT

## 2023-08-08 PROCEDURE — 85025 COMPLETE CBC W/AUTO DIFF WBC: CPT

## 2023-08-08 PROCEDURE — 84425 ASSAY OF VITAMIN B-1: CPT

## 2023-08-08 PROCEDURE — 80061 LIPID PANEL: CPT

## 2023-08-08 PROCEDURE — 83540 ASSAY OF IRON: CPT

## 2023-08-08 PROCEDURE — 84443 ASSAY THYROID STIM HORMONE: CPT

## 2023-08-08 NOTE — TELEPHONE ENCOUNTER
Patient called to verify which labs she needed to have done for Dr. Macy Camejo. Discussed with patient labs ordered on 2/15/23 to be completed are Iron, Urine drug screen, Vitamin B12, Vitamin D 25 hydroxy, Vitamin B1, HgA1c, TSH, Lipid panel, CMP, CBC, and Nicotine metabolites. Patient verbalized understanding.

## 2023-08-09 LAB
COMMENT:: ABNORMAL
COTININE UR QL SCN: POSITIVE NG/ML

## 2023-08-10 LAB — VIT B1 BLD-SCNC: 94.3 NMOL/L (ref 66.5–200)

## 2023-08-22 ENCOUNTER — CLINICAL DOCUMENTATION (OUTPATIENT)
Facility: HOSPITAL | Age: 43
End: 2023-08-22

## 2023-08-22 ENCOUNTER — HOSPITAL ENCOUNTER (OUTPATIENT)
Facility: HOSPITAL | Age: 43
Discharge: HOME | End: 2023-08-25

## 2023-08-22 DIAGNOSIS — I50.9 CONGESTIVE HEART FAILURE, UNSPECIFIED HF CHRONICITY, UNSPECIFIED HEART FAILURE TYPE (HCC): ICD-10-CM

## 2023-08-22 DIAGNOSIS — Z71.6 ENCOUNTER FOR TOBACCO USE CESSATION COUNSELING: ICD-10-CM

## 2023-08-22 DIAGNOSIS — Z72.0 TOBACCO USE: ICD-10-CM

## 2023-08-22 DIAGNOSIS — E66.01 CLASS 2 SEVERE OBESITY DUE TO EXCESS CALORIES WITH SERIOUS COMORBIDITY AND BODY MASS INDEX (BMI) OF 35.0 TO 35.9 IN ADULT (HCC): ICD-10-CM

## 2023-08-22 RX ORDER — NICOTINE 21 MG/24HR
PATCH, TRANSDERMAL 24 HOURS TRANSDERMAL
Qty: 28 PATCH | Refills: 0 | Status: SHIPPED | OUTPATIENT
Start: 2023-08-22

## 2023-08-22 NOTE — PROGRESS NOTES
[address]  [address] [facility], [address]    Behavior Modification Intervention    Patient's Name: Michaela Ochoa   Age: 44 y.o. YOB: 1980   Sex: female    Date:   8/22/2023              Session: 6 of 12  Surgeon:  Dr. Lizzy Knowles    Height: 5 f 3   Weight:    200      Lbs. BMI:    Pounds Lost since last month: 5                 Pounds Gained since last month: 0    Starting Weight: 200     Previous Month's Weight: 205  Overall Pounds Lost: 0   Overall Pounds Gained: 0    Do you smoke? Yes. She states she has, but is also on the patches    Alcohol intake:  Number of drinks at a time:  1  Number of times a week: 1    Class Guidelines    Guidelines are reviewed with patient at the start of every class. 1. Patient understands that weight loss trial classes must be consecutive. Patient understands if they miss a class, it is their responsibility to contact me to reschedule class. I will reach out to patient after their first no show. 2.  Patient understands the expectations that weight maintenance/weight loss is expected during the classes. Failure to demonstrate changes may result in one extra month of weight loss trial, followed by going back to see the surgeon. 3. Patient is also instructed to be doing their labs, blood work, psych visit, support group and any other test that the surgeon has used while they are working on their weight loss trial.   Patient understands that they CAN NOT gain any weight during the weight loss trial.  Gaining weight will result in extra classes    Other Pertinent Information:           Behavior Modification Intervention    At the last nutrition visit, Registered Dietitian talked to patient about the following behavior modification:  Meal Preparation   Patient has been challenged to work planning meals ahead and doing more meal preparation.     This can be done by:  Utilizing an Ramu to help

## 2023-09-05 ENCOUNTER — CLINICAL DOCUMENTATION (OUTPATIENT)
Facility: HOSPITAL | Age: 43
End: 2023-09-05

## 2023-09-05 ENCOUNTER — HOSPITAL ENCOUNTER (OUTPATIENT)
Facility: HOSPITAL | Age: 43
Discharge: HOME | End: 2023-09-08

## 2023-09-05 NOTE — PROGRESS NOTES
fast food intake, reducing carbonated beverages/soda intake, decreasing carbohydrates intake daily, etc. We reviewed protein shakes and high protein yogurts to chose, as well. During today's class, we also talked about how to read a label. Patient was given information on: The benefits of reading a label, which allowed one to compare the nutritional value of similar products and make healthy food decisions. The ingredient list, which can help to determine if the food is heathy or something that fits into the diet. The importance of reading the serving size and making sure to apply that to the portion size that they are consuming. Patient was also educated on carbohydrates. I talked to patient about: The function of carbohydrates. Foods that carbohydrate-heavy. Patient was given the guidelines to keep their carbohydrates less than 75 grams per day in the pre-op phase. Patient was also given ideas of low carb swaps, which include zucchini noodles, spaghetti squash, or cauliflower rice. Lower carbohydrate fruit options were discussed. Discussed lower carb swaps to use instead of potato chips. Patient's dietary habits include: Patient is eating 3 meals per day. Portions are:  small plates. Patient's frequency of fast food is: 0 times a week,  Patient's frequency of carry out is: 0 times a week. Patient's intake of sit down: 0 times week. Patient is eating carbohydrates: 2 times a day  Patient is snacking on crackers  This is being done: 1 times a day. I have talked to patient about some lower carbohydrate snack choices that focused more protein. Patient is drinking 64  ounces of fluid per day. Fluid intake is make up of: water. Patient is encouraged to focus on non-caloric, non-caffeine, non-carbonated liquids. Physical Activity/Exercise     Comments:     Currently for exercise, patient working towards 30 minutes per day.   I have talked to patient about some

## 2023-09-06 ENCOUNTER — CLINICAL DOCUMENTATION (OUTPATIENT)
Facility: HOSPITAL | Age: 43
End: 2023-09-06

## 2023-09-06 NOTE — PROGRESS NOTES
9/6/23:  Patient has completed her 12 nutrition visits, but is still actively smoking. She understands that she needs to be nicotine free for 3 months and once she has accomplished that, we will do her final weight check. Patient understands she also needs to be below her starting weight of 200 to move forward.     Buster Ramos MS RD

## 2023-09-15 DIAGNOSIS — Z72.0 TOBACCO USE: ICD-10-CM

## 2023-09-15 DIAGNOSIS — Z71.6 ENCOUNTER FOR TOBACCO USE CESSATION COUNSELING: ICD-10-CM

## 2023-09-15 DIAGNOSIS — I50.9 CONGESTIVE HEART FAILURE, UNSPECIFIED HF CHRONICITY, UNSPECIFIED HEART FAILURE TYPE (HCC): ICD-10-CM

## 2023-09-15 DIAGNOSIS — E66.01 CLASS 2 SEVERE OBESITY DUE TO EXCESS CALORIES WITH SERIOUS COMORBIDITY AND BODY MASS INDEX (BMI) OF 35.0 TO 35.9 IN ADULT (HCC): ICD-10-CM

## 2023-09-26 RX ORDER — NICOTINE 21 MG/24HR
PATCH, TRANSDERMAL 24 HOURS TRANSDERMAL
Qty: 28 PATCH | Refills: 1 | Status: SHIPPED | OUTPATIENT
Start: 2023-09-26